# Patient Record
Sex: MALE | Race: WHITE | Employment: OTHER | URBAN - METROPOLITAN AREA
[De-identification: names, ages, dates, MRNs, and addresses within clinical notes are randomized per-mention and may not be internally consistent; named-entity substitution may affect disease eponyms.]

---

## 2019-01-29 ENCOUNTER — HOSPITAL ENCOUNTER (OUTPATIENT)
Dept: ULTRASOUND IMAGING | Age: 64
Discharge: HOME | End: 2019-01-29
Payer: MEDICARE

## 2019-01-29 DIAGNOSIS — R10.11 RIGHT UPPER QUADRANT PAIN: ICD-10-CM

## 2019-01-29 PROCEDURE — 76700 US EXAM ABDOM COMPLETE: CPT

## 2019-02-12 ENCOUNTER — HOSPITAL ENCOUNTER (OUTPATIENT)
Dept: CT IMAGING | Age: 64
Discharge: HOME | End: 2019-02-12
Payer: MEDICARE

## 2019-02-12 ENCOUNTER — HOSPITAL ENCOUNTER (OUTPATIENT)
Age: 64
Discharge: HOME | End: 2019-02-12
Payer: MEDICARE

## 2019-02-12 DIAGNOSIS — N28.1 RENAL CYST: ICD-10-CM

## 2019-02-12 LAB
ANION GAP SERPL CALCULATED.3IONS-SCNC: 12 MMOL/L (ref 3–16)
BUN BLDV-MCNC: 27 MG/DL (ref 7–20)
CALCIUM SERPL-MCNC: 10.5 MG/DL (ref 8.3–10.6)
CHLORIDE BLD-SCNC: 104 MMOL/L (ref 99–110)
CO2: 27 MMOL/L (ref 21–32)
CREAT SERPL-MCNC: 1.6 MG/DL (ref 0.8–1.3)
GFR AFRICAN AMERICAN: 53
GFR NON-AFRICAN AMERICAN: 44
GLUCOSE BLD-MCNC: 103 MG/DL (ref 70–99)
POTASSIUM SERPL-SCNC: 4.3 MMOL/L (ref 3.5–5.1)
SODIUM BLD-SCNC: 143 MMOL/L (ref 136–145)

## 2019-02-12 PROCEDURE — 6360000004 HC RX CONTRAST MEDICATION: Performed by: PHYSICIAN ASSISTANT

## 2019-02-12 PROCEDURE — 36415 COLL VENOUS BLD VENIPUNCTURE: CPT

## 2019-02-12 PROCEDURE — 74178 CT ABD&PLV WO CNTR FLWD CNTR: CPT

## 2019-02-12 PROCEDURE — 80048 BASIC METABOLIC PNL TOTAL CA: CPT

## 2019-02-12 RX ADMIN — IOPAMIDOL 40 ML: 755 INJECTION, SOLUTION INTRAVENOUS at 17:12

## 2019-03-09 ENCOUNTER — HOSPITAL ENCOUNTER (EMERGENCY)
Age: 64
Discharge: HOME | End: 2019-03-09
Attending: EMERGENCY MEDICINE
Payer: MEDICARE

## 2019-03-09 ENCOUNTER — APPOINTMENT (OUTPATIENT)
Dept: GENERAL RADIOLOGY | Age: 64
End: 2019-03-09
Payer: MEDICARE

## 2019-03-09 VITALS
HEART RATE: 59 BPM | DIASTOLIC BLOOD PRESSURE: 84 MMHG | BODY MASS INDEX: 30.29 KG/M2 | OXYGEN SATURATION: 99 % | SYSTOLIC BLOOD PRESSURE: 184 MMHG | HEIGHT: 74 IN | WEIGHT: 236 LBS | TEMPERATURE: 99 F | RESPIRATION RATE: 12 BRPM

## 2019-03-09 DIAGNOSIS — M70.41 PREPATELLAR BURSITIS OF RIGHT KNEE: Primary | ICD-10-CM

## 2019-03-09 PROCEDURE — 73562 X-RAY EXAM OF KNEE 3: CPT

## 2019-03-09 PROCEDURE — 99283 EMERGENCY DEPT VISIT LOW MDM: CPT

## 2019-03-09 PROCEDURE — 6370000000 HC RX 637 (ALT 250 FOR IP): Performed by: EMERGENCY MEDICINE

## 2019-03-09 RX ORDER — CITALOPRAM 20 MG/1
1 TABLET ORAL DAILY
Refills: 5 | COMMUNITY
Start: 2018-12-16 | End: 2019-05-21 | Stop reason: ALTCHOICE

## 2019-03-09 RX ORDER — ACETAMINOPHEN 500 MG
500 TABLET ORAL EVERY 6 HOURS PRN
Qty: 20 TABLET | Refills: 0 | Status: SHIPPED | OUTPATIENT
Start: 2019-03-09 | End: 2019-05-21 | Stop reason: ALTCHOICE

## 2019-03-09 RX ORDER — ACETAMINOPHEN 500 MG
1000 TABLET ORAL ONCE
Status: COMPLETED | OUTPATIENT
Start: 2019-03-09 | End: 2019-03-09

## 2019-03-09 RX ORDER — HYDROCODONE BITARTRATE AND ACETAMINOPHEN 5; 325 MG/1; MG/1
1 TABLET ORAL EVERY 6 HOURS PRN
Qty: 5 TABLET | Refills: 0 | Status: SHIPPED | OUTPATIENT
Start: 2019-03-09 | End: 2019-03-13

## 2019-03-09 RX ORDER — IBUPROFEN 800 MG/1
800 TABLET ORAL EVERY 6 HOURS PRN
Qty: 24 TABLET | Refills: 0 | Status: SHIPPED | OUTPATIENT
Start: 2019-03-09 | End: 2019-09-09 | Stop reason: ALTCHOICE

## 2019-03-09 RX ORDER — BISOPROLOL FUMARATE 10 MG/1
1 TABLET ORAL DAILY
COMMUNITY
Start: 2019-03-09 | End: 2019-09-09 | Stop reason: ALTCHOICE

## 2019-03-09 RX ORDER — TAMSULOSIN HYDROCHLORIDE 0.4 MG/1
1 CAPSULE ORAL DAILY
Refills: 5 | COMMUNITY
Start: 2019-01-14 | End: 2019-09-09 | Stop reason: ALTCHOICE

## 2019-03-09 RX ORDER — OMEPRAZOLE 20 MG/1
1 CAPSULE, DELAYED RELEASE ORAL DAILY
Refills: 5 | COMMUNITY
Start: 2019-02-14 | End: 2019-05-21 | Stop reason: ALTCHOICE

## 2019-03-09 RX ORDER — FENOFIBRATE 160 MG/1
1 TABLET ORAL DAILY
Refills: 5 | COMMUNITY
Start: 2019-02-28

## 2019-03-09 RX ORDER — SIMVASTATIN 40 MG
1 TABLET ORAL DAILY
Refills: 5 | COMMUNITY
Start: 2019-01-03 | End: 2019-09-09 | Stop reason: ALTCHOICE

## 2019-03-09 RX ORDER — IBUPROFEN 400 MG/1
800 TABLET ORAL ONCE
Status: COMPLETED | OUTPATIENT
Start: 2019-03-09 | End: 2019-03-09

## 2019-03-09 RX ORDER — GABAPENTIN 300 MG/1
1 CAPSULE ORAL 3 TIMES DAILY
Refills: 0 | COMMUNITY
Start: 2019-02-20 | End: 2019-09-09 | Stop reason: ALTCHOICE

## 2019-03-09 RX ADMIN — IBUPROFEN 800 MG: 400 TABLET, FILM COATED ORAL at 11:38

## 2019-03-09 RX ADMIN — ACETAMINOPHEN 1000 MG: 500 TABLET ORAL at 11:38

## 2019-03-09 ASSESSMENT — ENCOUNTER SYMPTOMS
BACK PAIN: 0
SHORTNESS OF BREATH: 0
ABDOMINAL PAIN: 0

## 2019-03-09 ASSESSMENT — PAIN DESCRIPTION - FREQUENCY: FREQUENCY: CONTINUOUS

## 2019-03-09 ASSESSMENT — PAIN DESCRIPTION - ORIENTATION: ORIENTATION: RIGHT

## 2019-03-09 ASSESSMENT — PAIN DESCRIPTION - LOCATION: LOCATION: KNEE

## 2019-03-09 ASSESSMENT — PAIN SCALES - GENERAL
PAINLEVEL_OUTOF10: 6
PAINLEVEL_OUTOF10: 6

## 2019-03-09 ASSESSMENT — PAIN DESCRIPTION - PAIN TYPE: TYPE: ACUTE PAIN

## 2019-03-09 ASSESSMENT — PAIN DESCRIPTION - DESCRIPTORS: DESCRIPTORS: SHARP

## 2019-05-21 ENCOUNTER — HOSPITAL ENCOUNTER (EMERGENCY)
Age: 64
Discharge: HOME | End: 2019-05-21
Attending: EMERGENCY MEDICINE
Payer: MEDICARE

## 2019-05-21 VITALS
HEART RATE: 60 BPM | TEMPERATURE: 98 F | OXYGEN SATURATION: 96 % | BODY MASS INDEX: 30.29 KG/M2 | RESPIRATION RATE: 16 BRPM | DIASTOLIC BLOOD PRESSURE: 90 MMHG | HEIGHT: 74 IN | SYSTOLIC BLOOD PRESSURE: 146 MMHG | WEIGHT: 236 LBS

## 2019-05-21 DIAGNOSIS — G62.9 NEUROPATHY: ICD-10-CM

## 2019-05-21 DIAGNOSIS — M79.672 PAIN OF LEFT HEEL: Primary | ICD-10-CM

## 2019-05-21 PROCEDURE — 96372 THER/PROPH/DIAG INJ SC/IM: CPT

## 2019-05-21 PROCEDURE — 99282 EMERGENCY DEPT VISIT SF MDM: CPT

## 2019-05-21 PROCEDURE — 6360000002 HC RX W HCPCS: Performed by: EMERGENCY MEDICINE

## 2019-05-21 RX ORDER — ACETAMINOPHEN 500 MG
500 TABLET ORAL EVERY 6 HOURS PRN
COMMUNITY
End: 2019-09-09 | Stop reason: ALTCHOICE

## 2019-05-21 RX ORDER — KETOROLAC TROMETHAMINE 30 MG/ML
30 INJECTION, SOLUTION INTRAMUSCULAR; INTRAVENOUS ONCE
Status: COMPLETED | OUTPATIENT
Start: 2019-05-21 | End: 2019-05-21

## 2019-05-21 RX ADMIN — KETOROLAC TROMETHAMINE 30 MG: 30 INJECTION, SOLUTION INTRAMUSCULAR at 12:52

## 2019-05-21 ASSESSMENT — PAIN DESCRIPTION - LOCATION: LOCATION: FOOT

## 2019-05-21 ASSESSMENT — PAIN SCALES - GENERAL
PAINLEVEL_OUTOF10: 10
PAINLEVEL_OUTOF10: 10

## 2019-05-21 ASSESSMENT — PAIN DESCRIPTION - ORIENTATION: ORIENTATION: LEFT

## 2019-05-21 ASSESSMENT — PAIN DESCRIPTION - PAIN TYPE: TYPE: ACUTE PAIN;CHRONIC PAIN

## 2019-05-21 NOTE — ED PROVIDER NOTES
Emergency Department Attending Note    Darshan Brown DO    Date of ED VIsit: 5/21/2019    CHIEF COMPLAINT  Foot Pain (reports has always had left heel pain since developed sciatica in 2012. reports pcp placed him on gabapentin in 2012. reports stepped on a piece of dog food 4 days ago and having increased pain since. denies taking anything for pain pta. no distress)      HISTORY OF PRESENT ILLNESS  Laila Ba is a 70 y.o. male  With Vital signs of /88   Pulse 58   Temp 98 °F (36.7 °C) (Oral)   Resp 16   Ht 6' 2\" (1.88 m)   Wt 236 lb (107 kg)   SpO2 96%   BMI 30.30 kg/m²  who presents to the ED with a complaint of left heel pain. He has had this intermittently since developing sciatic area bilaterally after trauma 5 years ago. He stepped on a piece of dog food out of 4 days ago and has been having increased pain since then the left heel. He denies redness or swelling. He denies fever or chills. He denies any systemic symptoms. He has been taking Tylenol with minimal relief. No other complaints, modifying factors or associated symptoms. I have reviewed the following from the nursing documentation. Past Medical History:   Diagnosis Date    Hyperlipidemia     Hypertension     Kidney cysts     Kidney stone     Urinary frequency      Past Surgical History:   Procedure Laterality Date    BACK SURGERY       History reviewed. No pertinent family history.   Social History     Socioeconomic History    Marital status: Legally      Spouse name: Not on file    Number of children: Not on file    Years of education: Not on file    Highest education level: Not on file   Occupational History    Not on file   Social Needs    Financial resource strain: Not on file    Food insecurity:     Worry: Not on file     Inability: Not on file    Transportation needs:     Medical: Not on file     Non-medical: Not on file   Tobacco Use    Smoking status: Never Smoker    Smokeless tobacco: Never Used   Substance and Sexual Activity    Alcohol use: No    Drug use: No    Sexual activity: Not on file   Lifestyle    Physical activity:     Days per week: Not on file     Minutes per session: Not on file    Stress: Not on file   Relationships    Social connections:     Talks on phone: Not on file     Gets together: Not on file     Attends [employer]ars[employer] service: Not on file     Active member of club or organization: Not on file     Attends meetings of clubs or organizations: Not on file     Relationship status: Not on file    Intimate partner violence:     Fear of current or ex partner: Not on file     Emotionally abused: Not on file     Physically abused: Not on file     Forced sexual activity: Not on file   Other Topics Concern    Not on file   Social History Narrative    Not on file     No current facility-administered medications for this encounter. Current Outpatient Medications   Medication Sig Dispense Refill    acetaminophen (TYLENOL) 500 MG tablet Take 500 mg by mouth every 6 hours as needed for Pain      fenofibrate 160 MG tablet Take 1 tablet by mouth daily  5    gabapentin (NEURONTIN) 300 MG capsule Take 1 capsule by mouth 3 times daily. 0    simvastatin (ZOCOR) 40 MG tablet Take 1 tablet by mouth daily  5    tamsulosin (FLOMAX) 0.4 MG capsule Take 1 capsule by mouth daily  5    bisoprolol (ZEBETA) 10 MG tablet Take 1 tablet by mouth daily      ibuprofen (IBU) 800 MG tablet Take 1 tablet by mouth every 6 hours as needed for Pain 24 tablet 0     Allergies   Allergen Reactions    Paxil [Paroxetine Hcl] Hives       REVIEW OF SYSTEMS  10 systems reviewed, pertinent positives per HPI otherwise noted to be negative     PHYSICAL EXAM  /88   Pulse 58   Temp 98 °F (36.7 °C) (Oral)   Resp 16   Ht 6' 2\" (1.88 m)   Wt 236 lb (107 kg)   SpO2 96%   BMI 30.30 kg/m²   GENERAL APPEARANCE: Awake and alert. Cooperative. In no acute distress. HEAD: Normocephalic. Atraumatic.   EYES: PERRL. EOM's grossly intact. ENT: Mucous membranes are pink and moist.   NECK: Supple. HEART: RRR. No murmurs. LUNGS: Respirations unlabored. CTAB. Good air exchange. ABDOMEN: Soft. Non-distended. Non-tender. No masses. No organomegaly. No guarding or rebound. EXTREMITIES: No peripheral edema. Moves all extremities equally. All extremities neurovascularly intact. Locally tender on the heel at the insertion of the plantar fascia and Achilles. No other plantar tenderness. He does have mild pain with Achilles compression. Sensation intact. SKIN: Warm and dry. No acute rashes. NEUROLOGICAL: Alert and oriented. Strength 5/5, sensation intact. Gait normal.   PSYCHIATRIC: Normal mood and affect. No HI or SI expressed to me. RADIOLOGY    See below     EKG:     See below      ED COURSE/MDM        ED Course as of May 21 1345   Tue May 21, 2019   1342 Patient with pain over the tendon insertion on his heel. This consistent with tendinitis with exacerbation of patient's peripheral neuropathy pain. Recommended anti-inflammatories, conservative treatment, follow up with podiatry and his primary care physician. Return precautions given for any sign of infection    [WL]      ED Course User Index  [WL] Anthony Hoover DO       Old records were reviewed when applicable.  The ED course and plan were reviewed and results discussed with the patient    CLINICAL IMPRESSION and DISPOSITION  Yefri Manning was stable and diagnosed with heel tendinitis, peripheral neuropathy    Patient was treated with Toradol      CRITICAL CARE TIME:   N/A                      Anthony Hoover DO  05/21/19 2153

## 2019-09-09 ENCOUNTER — HOSPITAL ENCOUNTER (EMERGENCY)
Age: 64
Discharge: HOME | End: 2019-09-09
Payer: MEDICARE

## 2019-09-09 ENCOUNTER — APPOINTMENT (OUTPATIENT)
Dept: GENERAL RADIOLOGY | Age: 64
End: 2019-09-09
Payer: MEDICARE

## 2019-09-09 VITALS
OXYGEN SATURATION: 96 % | RESPIRATION RATE: 16 BRPM | HEIGHT: 74 IN | DIASTOLIC BLOOD PRESSURE: 114 MMHG | SYSTOLIC BLOOD PRESSURE: 177 MMHG | HEART RATE: 101 BPM | WEIGHT: 226 LBS | BODY MASS INDEX: 29 KG/M2 | TEMPERATURE: 97 F

## 2019-09-09 DIAGNOSIS — I10 ASYMPTOMATIC HYPERTENSION: Primary | ICD-10-CM

## 2019-09-09 LAB
A/G RATIO: 1.3 (ref 1.1–2.2)
ALBUMIN SERPL-MCNC: 4.7 G/DL (ref 3.4–5)
ALP BLD-CCNC: 42 U/L (ref 40–129)
ALT SERPL-CCNC: 18 U/L (ref 10–40)
ANION GAP SERPL CALCULATED.3IONS-SCNC: 9 MMOL/L (ref 3–16)
AST SERPL-CCNC: 23 U/L (ref 15–37)
BASOPHILS ABSOLUTE: 0.2 K/UL (ref 0–0.2)
BASOPHILS RELATIVE PERCENT: 2.6 %
BILIRUB SERPL-MCNC: 0.3 MG/DL (ref 0–1)
BUN BLDV-MCNC: 19 MG/DL (ref 7–20)
CALCIUM SERPL-MCNC: 10.2 MG/DL (ref 8.3–10.6)
CHLORIDE BLD-SCNC: 105 MMOL/L (ref 99–110)
CO2: 27 MMOL/L (ref 21–32)
CREAT SERPL-MCNC: 1.5 MG/DL (ref 0.8–1.3)
EKG ATRIAL RATE: 85 BPM
EKG DIAGNOSIS: NORMAL
EKG P AXIS: 22 DEGREES
EKG P-R INTERVAL: 176 MS
EKG Q-T INTERVAL: 346 MS
EKG QRS DURATION: 92 MS
EKG QTC CALCULATION (BAZETT): 411 MS
EKG R AXIS: 13 DEGREES
EKG T AXIS: -11 DEGREES
EKG VENTRICULAR RATE: 85 BPM
EOSINOPHILS ABSOLUTE: 0.1 K/UL (ref 0–0.6)
EOSINOPHILS RELATIVE PERCENT: 1.5 %
GFR AFRICAN AMERICAN: 57
GFR NON-AFRICAN AMERICAN: 47
GLOBULIN: 3.5 G/DL
GLUCOSE BLD-MCNC: 93 MG/DL (ref 70–99)
HCT VFR BLD CALC: 40.8 % (ref 40.5–52.5)
HEMOGLOBIN: 13.9 G/DL (ref 13.5–17.5)
LYMPHOCYTES ABSOLUTE: 0.9 K/UL (ref 1–5.1)
LYMPHOCYTES RELATIVE PERCENT: 12.8 %
MCH RBC QN AUTO: 29.7 PG (ref 26–34)
MCHC RBC AUTO-ENTMCNC: 33.9 G/DL (ref 31–36)
MCV RBC AUTO: 87.6 FL (ref 80–100)
MONOCYTES ABSOLUTE: 0.5 K/UL (ref 0–1.3)
MONOCYTES RELATIVE PERCENT: 7.8 %
NEUTROPHILS ABSOLUTE: 5.2 K/UL (ref 1.7–7.7)
NEUTROPHILS RELATIVE PERCENT: 75.3 %
PDW BLD-RTO: 13.3 % (ref 12.4–15.4)
PLATELET # BLD: 305 K/UL (ref 135–450)
PMV BLD AUTO: 7.4 FL (ref 5–10.5)
POTASSIUM REFLEX MAGNESIUM: 4.2 MMOL/L (ref 3.5–5.1)
RBC # BLD: 4.66 M/UL (ref 4.2–5.9)
SODIUM BLD-SCNC: 141 MMOL/L (ref 136–145)
TOTAL PROTEIN: 8.2 G/DL (ref 6.4–8.2)
TROPONIN: <0.01 NG/ML
WBC # BLD: 6.9 K/UL (ref 4–11)

## 2019-09-09 PROCEDURE — 93010 ELECTROCARDIOGRAM REPORT: CPT | Performed by: INTERNAL MEDICINE

## 2019-09-09 PROCEDURE — 84484 ASSAY OF TROPONIN QUANT: CPT

## 2019-09-09 PROCEDURE — 71046 X-RAY EXAM CHEST 2 VIEWS: CPT

## 2019-09-09 PROCEDURE — 85025 COMPLETE CBC W/AUTO DIFF WBC: CPT

## 2019-09-09 PROCEDURE — 93005 ELECTROCARDIOGRAM TRACING: CPT | Performed by: PHYSICIAN ASSISTANT

## 2019-09-09 PROCEDURE — 99283 EMERGENCY DEPT VISIT LOW MDM: CPT

## 2019-09-09 PROCEDURE — 80053 COMPREHEN METABOLIC PANEL: CPT

## 2019-09-09 RX ORDER — ATORVASTATIN CALCIUM 20 MG/1
20 TABLET, FILM COATED ORAL DAILY
COMMUNITY

## 2019-09-09 RX ORDER — LISINOPRIL 10 MG/1
10 TABLET ORAL 2 TIMES DAILY
COMMUNITY

## 2019-09-09 RX ORDER — LORATADINE 10 MG/1
10 CAPSULE, LIQUID FILLED ORAL DAILY
COMMUNITY

## 2019-09-09 ASSESSMENT — ENCOUNTER SYMPTOMS
RESPIRATORY NEGATIVE: 1
GASTROINTESTINAL NEGATIVE: 1

## 2019-09-09 ASSESSMENT — PAIN SCALES - GENERAL: PAINLEVEL_OUTOF10: 6

## 2019-09-09 ASSESSMENT — PAIN DESCRIPTION - PAIN TYPE: TYPE: ACUTE PAIN

## 2019-09-09 ASSESSMENT — PAIN DESCRIPTION - DESCRIPTORS: DESCRIPTORS: CONSTANT

## 2019-09-09 ASSESSMENT — PAIN DESCRIPTION - ONSET: ONSET: ON-GOING

## 2019-09-09 ASSESSMENT — PAIN DESCRIPTION - ORIENTATION: ORIENTATION: LEFT

## 2019-09-09 ASSESSMENT — PAIN DESCRIPTION - FREQUENCY: FREQUENCY: CONTINUOUS

## 2019-09-09 ASSESSMENT — PAIN DESCRIPTION - LOCATION: LOCATION: FLANK

## 2020-01-01 ENCOUNTER — HOSPITAL ENCOUNTER (OUTPATIENT)
Dept: CT IMAGING | Age: 65
Discharge: HOME | End: 2020-05-24
Payer: MEDICARE

## 2020-01-01 PROCEDURE — 74176 CT ABD & PELVIS W/O CONTRAST: CPT

## 2021-01-01 ENCOUNTER — HOSPITAL ENCOUNTER (EMERGENCY)
Age: 66
End: 2021-01-03
Attending: STUDENT IN AN ORGANIZED HEALTH CARE EDUCATION/TRAINING PROGRAM
Payer: MEDICARE

## 2021-01-01 VITALS — HEIGHT: 74 IN | WEIGHT: 250 LBS | BODY MASS INDEX: 32.08 KG/M2

## 2021-01-01 DIAGNOSIS — I46.9 CARDIAC ARREST (HCC): Primary | ICD-10-CM

## 2021-01-01 PROCEDURE — 31500 INSERT EMERGENCY AIRWAY: CPT

## 2021-01-01 PROCEDURE — 6360000002 HC RX W HCPCS: Performed by: STUDENT IN AN ORGANIZED HEALTH CARE EDUCATION/TRAINING PROGRAM

## 2021-01-01 PROCEDURE — 99283 EMERGENCY DEPT VISIT LOW MDM: CPT

## 2021-01-01 PROCEDURE — 92950 HEART/LUNG RESUSCITATION CPR: CPT

## 2021-01-01 RX ORDER — EPINEPHRINE 0.1 MG/ML
SYRINGE (ML) INJECTION DAILY PRN
Status: COMPLETED | OUTPATIENT
Start: 2021-01-01 | End: 2021-01-01

## 2021-01-01 RX ADMIN — EPINEPHRINE 1 MG: 0.1 INJECTION, SOLUTION ENDOTRACHEAL; INTRACARDIAC; INTRAVENOUS at 05:17

## 2021-01-01 RX ADMIN — EPINEPHRINE 1 MG: 0.1 INJECTION, SOLUTION ENDOTRACHEAL; INTRACARDIAC; INTRAVENOUS at 05:21

## 2021-01-03 NOTE — ED PROVIDER NOTES
[facility] EMERGENCY DEPARTMENT      CHIEF COMPLAINT  No chief complaint on file. HISTORY OF PRESENT ILLNESS  Neda Valentin is a 77 y.o. male  who presents to the ED as a code in progress after EMS was called with concerns for chest pain/shortness of breath. Initial EKG with elevation in inferior leads. Patient had a witnessed cardiac arrest by EMS, arrived with CPR in progress. Prior to arrival, had already received 20 minutes of CPR, initial presenting rhythm was found to be V. tach or V. fib, patient lost pulses and CPR was initiated. He received 2 doses of epi, was shocked once at 100 J, once at 150 J, and was at 200 J without of regaining a pulse. I have reviewed the following from the nursing documentation. Past Medical History:   Diagnosis Date    Hyperlipidemia     Hypertension     Kidney cysts     Kidney stone     Urinary frequency      Past Surgical History:   Procedure Laterality Date    BACK SURGERY       No family history on file.   Social History     Socioeconomic History    Marital status: Legally      Spouse name: Not on file    Number of children: Not on file    Years of education: Not on file    Highest education level: Not on file   Occupational History    Not on file   Social Needs    Financial resource strain: Not on file    Food insecurity     Worry: Not on file     Inability: Not on file    Transportation needs     Medical: Not on file     Non-medical: Not on file   Tobacco Use    Smoking status: Never Smoker    Smokeless tobacco: Never Used   Substance and Sexual Activity    Alcohol use: No    Drug use: No    Sexual activity: Not Currently   Lifestyle    Physical activity     Days per week: Not on file     Minutes per session: Not on file    Stress: Not on file   Relationships    Social connections     Talks on phone: Not on file     Gets together: Not on file     Attends [employer] service: Not on file     Active member of club or organization: Not on file     Attends meetings of clubs or organizations: Not on file     Relationship status: Not on file    Intimate partner violence     Fear of current or ex partner: Not on file     Emotionally abused: Not on file     Physically abused: Not on file     Forced sexual activity: Not on file   Other Topics Concern    Not on file   Social History Narrative    Not on file     No current facility-administered medications for this encounter. Current Outpatient Medications   Medication Sig Dispense Refill    loratadine (CLARITIN) 10 MG capsule Take 10 mg by mouth daily      atorvastatin (LIPITOR) 20 MG tablet Take 20 mg by mouth daily      lisinopril (PRINIVIL;ZESTRIL) 10 MG tablet Take 10 mg by mouth 2 times daily      fenofibrate 160 MG tablet Take 1 tablet by mouth daily  5     Allergies   Allergen Reactions    Paxil [Paroxetine Hcl] Hives       REVIEW OF SYSTEMS  10 systems reviewed, pertinent positives per HPI otherwise noted to be negative. PHYSICAL EXAM  Ht 6' 2\" (1.88 m)   Wt 250 lb (113.4 kg)   BMI 32.10 kg/m²    GENERAL APPEARANCE: Unresponsive  HENT: Normocephalic. Atraumatic. NECK: Supple. EYES: Pupils equal, nonresponsive  HEART/CHEST: RRR. No murmurs. LUNGS: R sided breath sounds, no breath sounds on L initially   ABDOMEN: Soft, mildly distended  MUSCULOSKELETAL: No extremity edema. No deformity. SKIN: No acute rashes. NEUROLOGICAL: Unresponsive  PSYCHIATRIC: Unresponsive    LABS  I have reviewed all labs for this visit. No results found for this visit on 01/03/21. RADIOLOGY  N/A    ED COURSE/MDM  Patient seen and evaluated. Old records reviewed. Labs and imaging reviewed     Patient is a 78-year-old male with history of hyperlipidemia hypertension who presented via EMS with concerns for cardiac arrest.  They were initially called for chest pain and shortness of breath, patient became unresponsive and lost a pulse and it was witnessed by EMS.   Initial presenting rhythm was either V. fib or V. tach, patient was pulseless. CPR was initiated, patient received 2 doses of epi and 3 shocks. ROSC was never achieved. Upon arrival in the ED, CPR was in progress with [place] Jet[employer] device. Initial presenting rhythm was found to be V. fib. Patient remained pulseless and unresponsive. Breath sounds were only able to be heard on the right, as the ET tube was initially positioned at 29 cm at the lip, we did retracted to 26 cm with improvement of breath sounds bilaterally. Bedside ultrasound was performed by myself, did not reveal any evidence of cardiac activity. Patient received 1 shock at 200 J, as well as 2 doses of epinephrine and regained a pulse or any evidence of cardiac motion. This patient has had lost pulses 30 minutes prior to this, with no return of spontaneous circulation, and no improvement of cardiac activity on ultrasound, decision is made to call time of death at [phone]. Patient's PCP is MICHAEL Bro is the supervising physician who will sign the death certificate and she was contacted and informed of the situation. During the patient's ED course, the patient was given:  Medications - No data to display     CLINICAL IMPRESSION  1. Cardiac arrest ([address]        Height 6' 2\" (1.88 m), weight 250 lb (113.4 kg). Karla was pronounced  at [phone]. Patient was given scripts for the following medications. I counseled patient how to take these medications. New Prescriptions    No medications on file       Follow-up with:  No follow-up provider specified. DISCLAIMER: This chart was created using Dragon dictation software. Efforts were made by me to ensure accuracy, however some errors may be present due to limitations of this technology and occasionally words are not transcribed correctly.        Donald Joyce MD  [phone]

## 2021-01-03 NOTE — ED NOTES
Call from son and daughter-in-law, want to use [facility] in [facility]. Updated Jane García, son, phone number in chart.       Douglas Mcrae RN  01/03/21 [id number]

## 2021-01-03 NOTE — ED NOTES
Return call from [phone], pt will be used for donation purposes. [phone] on their way to  and family is aware.       Bibiana Boone RN  01/03/21 9766